# Patient Record
Sex: MALE | Race: WHITE
[De-identification: names, ages, dates, MRNs, and addresses within clinical notes are randomized per-mention and may not be internally consistent; named-entity substitution may affect disease eponyms.]

---

## 2022-06-30 ENCOUNTER — HOSPITAL ENCOUNTER (OUTPATIENT)
Dept: HOSPITAL 76 - DI.S | Age: 27
Discharge: HOME | End: 2022-06-30
Attending: NURSE PRACTITIONER
Payer: COMMERCIAL

## 2022-06-30 DIAGNOSIS — S67.195A: Primary | ICD-10-CM

## 2022-06-30 PROCEDURE — 1040M: CPT

## 2022-06-30 NOTE — XRAY REPORT
PROCEDURE:  Finger(s) LT

 

INDICATIONS:  LEFT 4TH DIGIT FINGER CRUSHING INJURY

 

TECHNIQUE:  AP hand, 2 views of the left fourth finger(s) acquired.  

 

COMPARISON:  None

 

FINDINGS:  

 

Bones:  No fractures or dislocations.  No suspicious bony lesions.  

 

Soft tissues:  No suspicious soft tissue calcifications.  

 

IMPRESSION:  

No acute fracture. No osseous lesion. If symptoms and/or clinical suspicion for pathology continue, f
urther assessment with repeat plain films, or advanced imaging (e.g., CT, MRI, or bone scan) is recom
mended for further assessment.

 

Reviewed by: April Solis MD on 6/30/2022 4:28 PM [email or web address]

Approved by: April Solis MD on 6/30/2022 4:28 PM [email or web address]

 

 

Station ID:  [id number]